# Patient Record
Sex: FEMALE | URBAN - METROPOLITAN AREA
[De-identification: names, ages, dates, MRNs, and addresses within clinical notes are randomized per-mention and may not be internally consistent; named-entity substitution may affect disease eponyms.]

---

## 2024-03-29 ENCOUNTER — APPOINTMENT (RX ONLY)
Dept: URBAN - METROPOLITAN AREA CLINIC 52 | Facility: CLINIC | Age: 64
Setting detail: DERMATOLOGY
End: 2024-03-29

## 2024-03-29 DIAGNOSIS — D18.0 HEMANGIOMA: ICD-10-CM

## 2024-03-29 DIAGNOSIS — L71.8 OTHER ROSACEA: ICD-10-CM | Status: INADEQUATELY CONTROLLED

## 2024-03-29 PROBLEM — D18.01 HEMANGIOMA OF SKIN AND SUBCUTANEOUS TISSUE: Status: ACTIVE | Noted: 2024-03-29

## 2024-03-29 PROCEDURE — ? COUNSELING

## 2024-03-29 PROCEDURE — 99204 OFFICE O/P NEW MOD 45 MIN: CPT

## 2024-03-29 PROCEDURE — ? DEFER

## 2024-03-29 PROCEDURE — ? PRESCRIPTION

## 2024-03-29 RX ORDER — METRONIDAZOLE 10 MG/G
GEL TOPICAL QHS
Qty: 60 | Refills: 3 | Status: ERX | COMMUNITY
Start: 2024-03-29

## 2024-03-29 RX ADMIN — METRONIDAZOLE: 10 GEL TOPICAL at 00:00

## 2024-03-29 ASSESSMENT — LOCATION SIMPLE DESCRIPTION DERM
LOCATION SIMPLE: LEFT CHEEK
LOCATION SIMPLE: RIGHT CHEEK

## 2024-03-29 ASSESSMENT — LOCATION DETAILED DESCRIPTION DERM
LOCATION DETAILED: RIGHT INFERIOR CENTRAL MALAR CHEEK
LOCATION DETAILED: LEFT CENTRAL MALAR CHEEK

## 2024-03-29 ASSESSMENT — LOCATION ZONE DERM: LOCATION ZONE: FACE

## 2024-03-29 NOTE — PROCEDURE: DEFER
X Size Of Lesion In Cm (Optional): 0
Procedure To Be Performed At Next Visit: Treatment
Introduction Text (Please End With A Colon): Codes to be deferred: 99214x1
Detail Level: Detailed

## 2024-04-26 ENCOUNTER — APPOINTMENT (RX ONLY)
Dept: URBAN - METROPOLITAN AREA CLINIC 52 | Facility: CLINIC | Age: 64
Setting detail: DERMATOLOGY
End: 2024-04-26

## 2024-04-26 DIAGNOSIS — L71.8 OTHER ROSACEA: ICD-10-CM

## 2024-04-26 DIAGNOSIS — L60.3 NAIL DYSTROPHY: ICD-10-CM

## 2024-04-26 PROCEDURE — ? COUNSELING

## 2024-04-26 PROCEDURE — ? ADDITIONAL NOTES

## 2024-04-26 PROCEDURE — ? PRESCRIPTION

## 2024-04-26 PROCEDURE — 99213 OFFICE O/P EST LOW 20 MIN: CPT

## 2024-04-26 RX ORDER — DOXYCYCLINE HYCLATE 50 MG/1
CAPSULE, GELATIN COATED ORAL
Qty: 30 | Refills: 1 | Status: ERX | COMMUNITY
Start: 2024-04-26

## 2024-04-26 RX ADMIN — DOXYCYCLINE HYCLATE: 50 CAPSULE, GELATIN COATED ORAL at 00:00

## 2024-06-13 ENCOUNTER — APPOINTMENT (RX ONLY)
Dept: URBAN - METROPOLITAN AREA CLINIC 52 | Facility: CLINIC | Age: 64
Setting detail: DERMATOLOGY
End: 2024-06-13

## 2024-06-13 ENCOUNTER — RX ONLY (OUTPATIENT)
Age: 64
Setting detail: RX ONLY
End: 2024-06-13

## 2024-06-13 DIAGNOSIS — L60.3 NAIL DYSTROPHY: ICD-10-CM

## 2024-06-13 DIAGNOSIS — L82.0 INFLAMED SEBORRHEIC KERATOSIS: ICD-10-CM

## 2024-06-13 DIAGNOSIS — L71.8 OTHER ROSACEA: ICD-10-CM

## 2024-06-13 PROCEDURE — ? COUNSELING

## 2024-06-13 PROCEDURE — ? DEFER

## 2024-06-13 PROCEDURE — 99213 OFFICE O/P EST LOW 20 MIN: CPT

## 2024-06-13 RX ORDER — DOXYCYCLINE HYCLATE 50 MG/1
CAPSULE, GELATIN COATED ORAL
Qty: 180 | Refills: 0 | Status: ERX

## 2025-02-19 ENCOUNTER — RX ONLY (RX ONLY)
Age: 65
End: 2025-02-19

## 2025-02-19 ENCOUNTER — APPOINTMENT (OUTPATIENT)
Dept: URBAN - METROPOLITAN AREA CLINIC 52 | Facility: CLINIC | Age: 65
Setting detail: DERMATOLOGY
End: 2025-02-19

## 2025-02-19 DIAGNOSIS — L71.8 OTHER ROSACEA: ICD-10-CM

## 2025-02-19 DIAGNOSIS — L72.0 EPIDERMAL CYST: ICD-10-CM

## 2025-02-19 PROCEDURE — 99213 OFFICE O/P EST LOW 20 MIN: CPT

## 2025-02-19 PROCEDURE — ? COUNSELING

## 2025-02-19 PROCEDURE — ? PHOTO-DOCUMENTATION

## 2025-02-19 RX ORDER — DOXYCYCLINE HYCLATE 50 MG/1
CAPSULE, GELATIN COATED ORAL
Qty: 180 | Refills: 0 | Status: ERX

## 2025-02-19 ASSESSMENT — LOCATION ZONE DERM: LOCATION ZONE: TRUNK

## 2025-02-19 ASSESSMENT — LOCATION DETAILED DESCRIPTION DERM: LOCATION DETAILED: RIGHT MEDIAL SUPERIOR CHEST

## 2025-02-19 ASSESSMENT — LOCATION SIMPLE DESCRIPTION DERM: LOCATION SIMPLE: CHEST
